# Patient Record
Sex: FEMALE | Race: WHITE
[De-identification: names, ages, dates, MRNs, and addresses within clinical notes are randomized per-mention and may not be internally consistent; named-entity substitution may affect disease eponyms.]

---

## 2017-06-10 ENCOUNTER — HOSPITAL ENCOUNTER (EMERGENCY)
Dept: HOSPITAL 25 - UCEAST | Age: 27
Discharge: HOME | End: 2017-06-10
Payer: COMMERCIAL
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
## 2017-06-10 DIAGNOSIS — S39.012A: Primary | ICD-10-CM
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
## 2017-06-10 PROCEDURE — 99212 OFFICE O/P EST SF 10 MIN: CPT

## 2017-06-10 PROCEDURE — G0463 HOSPITAL OUTPT CLINIC VISIT: HCPCS

## 2017-06-10 NOTE — UC
Back Pain HPI





- HPI Summary


HPI Summary: 


3 DAYS AGO HAD SOME LOW BACK PAIN AFTER LIFTING A LAUNDRY BASKET. HAS CONTINUED 

TO GO TO THE GYM AND WORK. PAIN IS PERSISTENT. DENIES NUMBNESS, TINGLING OR 

SADDLE ANESTHESIA. NO LOSS OF BOWEL OR BLADDER CONTROL. 800 MG IBUPROFEN, 

TYLENOL, ICY HOT NOT HELPING. 





- History of Current Complaint


Chief Complaint: UCBackPain


Stated Complaint: LOWER BACK INJURY


Time Seen by Provider: 06/10/17 10:56


Hx Obtained From: Patient


Hx Last Menstrual Period: 5/27/17


Onset/Duration: Sudden Onset, Lasting Days, Still Present


Timing: Constant


Severity Initially: Moderate


Severity Currently: Moderate


Pain Intensity: 9


Pain Scale Used: 0-10 Numeric


Back Pain: Is Discrete @ - LOW BACK


Character: Sharp, Aching


Aggravating: Movement, Bending


Alleviating: Rest


Associated Signs And Symptoms: Negative: Swelling, Redness, Bruising, Fever, 

Numbness, Tingling, Abdominal Pain, Flank Pain, Bladder Incontinence, Bowel 

Incontinence





- Allergies/Home Medications


Allergies/Adverse Reactions: 


 Allergies











Allergy/AdvReac Type Severity Reaction Status Date / Time


 


No Known Allergies Allergy   Verified 06/10/17 10:12











Home Medications: 


 Home Medications





Norgestimate-Ethinyl Estradiol [Tri-Sprintec 0.18/0.215/0.25 mg-35 Mcg] 1 tab 

PO DAILY 06/10/17 [History Confirmed 06/10/17]











PMH/Surg Hx/FS Hx/Imm Hx


Previously Healthy: Yes





- Surgical History


Surgical History: None


Surgery Procedure, Year, and Place: denies





- Family History


Known Family History: 


   Negative: Hypertension


Family History: NON CONTRIBUTORY





- Social History


Alcohol Use: Occasionally


Substance Use Type: None


Smoking Status (MU): Never Smoked Tobacco





- Immunization History


Most Recent Influenza Vaccination: 2015     season


Most Recent Tetanus Shot: UTD





Review of Systems


Constitutional: Negative


Skin: Negative


Respiratory: Negative


Cardiovascular: Negative


Gastrointestinal: Negative


Musculoskeletal: Arthralgia, Decreased ROM, Myalgia


All Other Systems Reviewed And Are Negative: Yes





Physical Exam


Triage Information Reviewed: Yes


Appearance: Well-Appearing, No Pain Distress, Well-Nourished


Vital Signs: 


 Initial Vital Signs











Temp  98.0 F   06/10/17 10:08


 


Pulse  78   06/10/17 10:08


 


Resp  16   06/10/17 10:08


 


BP  157/65   06/10/17 10:08


 


Pulse Ox  100   06/10/17 10:08











Vital Signs Reviewed: Yes


Eyes: Positive: Conjunctiva Clear


ENT: Positive: Hearing grossly normal


Neck: Positive: Supple


Respiratory: Positive: No respiratory distress, No accessory muscle use


Cardiovascular: Positive: Pulses Normal


Abdomen Description: Positive: Soft


Musculoskeletal: Positive: No Edema, ROM Limited @ - BACK


Neurological: Positive: Alert


Psychological: Positive: Age Appropriate Behavior


Skin: Negative: rashes





Back Pain Course/Dx





- Differential Dx/Diagnosis


Provider Diagnoses: ACUTE LOW BACK STRAIN





Discharge





- Discharge Plan


Condition: Stable


Disposition: HOME


Prescriptions: 


Cyclobenzaprine TAB* [Flexeril TAB*] 10 mg PO BID PRN #30 tab


 PRN Reason: Pain


Hydrocodone-Acetaminophen [Lorcet 5-325 mg] 1 tab PO QID PRN #20 tab MDD 4


 PRN Reason: Pain


Meloxicam [Mobic] 7.5 mg PO BID PRN #30 tab


 PRN Reason: Pain


predniSONE TAB* [Deltasone TAB*] 40 mg PO DAILY #10 tab


Patient Education Materials:  Low Back Strain (ED)


Referrals: 


Alen Sanchez MD [Primary Care Provider] - If Needed


Additional Instructions: 


BE SURE TO GO THROUGH SLOW RANGE OF MOTION AND STRETCHING EXERCISES DAILY AS 

YOU ARE ABLE TO PREVENT STIFFENING UP AND MAKING THE DISCOMFORT WORSE.

## 2018-02-12 ENCOUNTER — HOSPITAL ENCOUNTER (EMERGENCY)
Dept: HOSPITAL 25 - UCEAST | Age: 28
Discharge: HOME | End: 2018-02-12
Payer: COMMERCIAL

## 2018-02-12 VITALS — SYSTOLIC BLOOD PRESSURE: 127 MMHG | DIASTOLIC BLOOD PRESSURE: 83 MMHG

## 2018-02-12 DIAGNOSIS — R30.0: ICD-10-CM

## 2018-02-12 DIAGNOSIS — Z87.440: ICD-10-CM

## 2018-02-12 DIAGNOSIS — R10.2: Primary | ICD-10-CM

## 2018-02-12 DIAGNOSIS — Z32.02: ICD-10-CM

## 2018-02-12 PROCEDURE — 81025 URINE PREGNANCY TEST: CPT

## 2018-02-12 PROCEDURE — G0463 HOSPITAL OUTPT CLINIC VISIT: HCPCS

## 2018-02-12 PROCEDURE — 76830 TRANSVAGINAL US NON-OB: CPT

## 2018-02-12 PROCEDURE — 99211 OFF/OP EST MAY X REQ PHY/QHP: CPT

## 2018-02-12 PROCEDURE — 87591 N.GONORRHOEAE DNA AMP PROB: CPT

## 2018-02-12 PROCEDURE — 87480 CANDIDA DNA DIR PROBE: CPT

## 2018-02-12 PROCEDURE — 87086 URINE CULTURE/COLONY COUNT: CPT

## 2018-02-12 PROCEDURE — 87661 TRICHOMONAS VAGINALIS AMPLIF: CPT

## 2018-02-12 PROCEDURE — 87491 CHLMYD TRACH DNA AMP PROBE: CPT

## 2018-02-12 PROCEDURE — 81003 URINALYSIS AUTO W/O SCOPE: CPT

## 2018-02-12 PROCEDURE — 87510 GARDNER VAG DNA DIR PROBE: CPT

## 2018-02-12 NOTE — RAD
Indication: RIGHT side pelvic pain for 5 days.



Comparison: No relevant prior exams available on the [facility] [place] for comparison.



Technique: Transvaginal pelvic ultrasound.



Report: 8.1 x 3.5 x 3.7 cm anteverted uterus with 5 mm endometrium. Trace fluid in the

endocervical canal.



No appreciable free pelvic fluid evident.



2.1 x 1.1 x 1.9 cm RIGHT ovary with documented vascular flow is unremarkable. 2.0 x 1.1 x

0.9 cm LEFT ovary with documented vascular flow is unremarkable.



No visualized extra ovarian adnexal region lesions evident.



IMPRESSION: Negative pelvic ultrasound.

## 2018-02-12 NOTE — UC
Complaint Female HPI





- HPI Summary


HPI Summary: 





26 yo female has not felt right ("down there") for about a month


was treated for a UTI a month ago with a three day coarse of antibotics


since then has had mild dyspareunia


had a bit diarrhea and nausea last week


now with right sided pelvic pain x 3 days


no vag d/c or itch


no f/c


no back pain


mild intermittent dysuria





pain is mild


no increased pain with jumping/walking/movement





- History Of Current Complaint


Chief Complaint: UCAbdominalPain


Stated Complaint: LOWER PELVIC PAIN


Time Seen by Provider: 02/12/18 09:03


Hx Obtained From: Patient


Hx Last Menstrual Period: 2/5/18


Onset/Duration: Gradual Onset, Lasting Days


Timing: Constant


Severity Initially: Mild


Severity Currently: Mild


Pain Intensity: 3


Pain Scale Used: 0-10 Numeric


Character: Dull


Aggravating Factor(s): [place]


Alleviating Factor(s): Nothing


Associated Signs And Symptoms: Negative: Fever, Back Pain, Vaginal Bleeding/

Discharge, Vaginal Discharge, Nausea, Vomiting(# Of Episodes =), Genital 

Swelling, Genital Blisters, Retained Foregin Body (Specify)





- Allergies/Home Medications


Allergies/Adverse Reactions: 


 Allergies











Allergy/AdvReac Type Severity Reaction Status Date / Time


 


No Known Allergies Allergy   Verified 02/12/18 08:39











Home Medications: 


 Home Medications





Norgestimate-Ethinyl Estradiol [Tri-Sprintec Tablet] 1 tab PO DAILY 02/12/18 [

History Confirmed 02/12/18]











PMH/Surg Hx/FS Hx/Imm Hx


Previously Healthy: Yes





- Surgical History


Surgical History: None


Surgery Procedure, Year, and Place: denies





- Family History


Known Family History: 


   Negative: Hypertension


Family History: NON CONTRIBUTORY





- Social History


Alcohol Use: Occasionally


Substance Use Type: None


Smoking Status (MU): Never Smoked Tobacco





- Immunization History


Most Recent Influenza Vaccination: 2015     season


Most Recent Tetanus Shot: UTD





Review of Systems


Constitutional: Negative


Skin: Negative


Eyes: Negative


ENT: Negative


Respiratory: Negative


Cardiovascular: Negative


Gastrointestinal: Negative


Genitourinary: Other - right pelvic pain


Motor: Negative


Neurovascular: Negative


Musculoskeletal: Negative


Neurological: Negative


Psychological: Negative


Is Patient Immunocompromised?: No


All Other Systems Reviewed And Are Negative: Yes





Physical Exam


Triage Information Reviewed: Yes


Appearance: Well-Appearing, No Pain Distress, Well-Nourished


Vital Signs: 


 Initial Vital Signs











Temp  97.6 F   02/12/18 08:41


 


Pulse  78   02/12/18 08:41


 


Resp  16   02/12/18 08:41


 


BP  127/83   02/12/18 08:41


 


Pulse Ox  100   02/12/18 08:41











Eye Exam: Normal


ENT: Positive: Normal ENT inspection, Hearing grossly normal, Pharynx normal.  

Negative: Nasal drainage, TMs normal, TM bulging, Sinus tenderness, Uvula 

midline


Neck: Positive: Supple, Nontender, No Lymphadenopathy


Respiratory: Positive: Lungs clear, Normal breath sounds, No respiratory 

distress, No accessory muscle use


Cardiovascular: Positive: RRR, No Murmur


Abdomen Description: Positive: Nontender, No Organomegaly, Soft.  Negative: CVA 

Tenderness (R), CVA Tenderness (L), Distended, Guarding, Hernia @, Hepatomegaly

, McBurney's Point Tenderness, Peritoneal Signs, Pulsatile Mass, Splenomegaly


Musculoskeletal: Positive: ROM Intact, No Edema


Neurological: Positive: Alert


Psychological Exam: Normal


Skin Exam: Normal





Diagnostics





- Radiology


  ** No standard instances


Xray Interpretation: No Acute Changes


Radiology Interpretation Completed By: Radiologist





 Complaint Female Dx





- Course


Course Of Treatment: speculum exam:  ext gent- no lesions.  vagina- mild to 

moderate whitish D/C.  cx- no CMT.  adenexa- tender right.  uterus- non tender





- Differential Dx/Diagnosis


Provider Diagnoses: right sided pelvic pain of uncertain cause





Discharge





- Discharge Plan


Condition: Stable


Disposition: HOME


Patient Education Materials:  Pelvic Pain (ED)


Referrals: 


Kolby Herrera MD [Medical Doctor] - If Needed


Additional Instructions: 


various tests are pending including a urine culture and tests on you vaginal 

swabs





I an unsure of the cause of your pain





I suggest that if it worsens you go to the ER


-increased pain


-fever 


-vomiting





 recheck later this week if not better


-here


[facility]


-[facility]

## 2018-03-10 ENCOUNTER — HOSPITAL ENCOUNTER (EMERGENCY)
Dept: HOSPITAL 25 - UCEAST | Age: 28
Discharge: HOME | End: 2018-03-10
Payer: COMMERCIAL

## 2018-03-10 VITALS — SYSTOLIC BLOOD PRESSURE: 139 MMHG | DIASTOLIC BLOOD PRESSURE: 86 MMHG

## 2018-03-10 DIAGNOSIS — K59.00: Primary | ICD-10-CM

## 2018-03-10 DIAGNOSIS — R30.0: ICD-10-CM

## 2018-03-10 DIAGNOSIS — R19.7: ICD-10-CM

## 2018-03-10 DIAGNOSIS — R35.0: ICD-10-CM

## 2018-03-10 DIAGNOSIS — R03.0: ICD-10-CM

## 2018-03-10 DIAGNOSIS — R11.0: ICD-10-CM

## 2018-03-10 DIAGNOSIS — Z32.02: ICD-10-CM

## 2018-03-10 DIAGNOSIS — Z87.440: ICD-10-CM

## 2018-03-10 PROCEDURE — 84702 CHORIONIC GONADOTROPIN TEST: CPT

## 2018-03-10 PROCEDURE — G0463 HOSPITAL OUTPT CLINIC VISIT: HCPCS

## 2018-03-10 PROCEDURE — 87086 URINE CULTURE/COLONY COUNT: CPT

## 2018-03-10 PROCEDURE — 81003 URINALYSIS AUTO W/O SCOPE: CPT

## 2018-03-10 PROCEDURE — 99212 OFFICE O/P EST SF 10 MIN: CPT

## 2018-03-10 PROCEDURE — 74018 RADEX ABDOMEN 1 VIEW: CPT

## 2018-03-10 NOTE — UC
Pankaj HOOD Jennifer, scribed for Luzma Cleary DO on 03/10/18 at 0810 .





 Complaint Female HPI





- HPI Summary


HPI Summary: 





The pt is a 29 y/o female who complains of pelvic pain that radiates to her 

right abdomen and both sides of her back since one month ago. Pt reports she 

was at Urgent Care for pelvic pain one month ago but the transvaginal 

ultrasound was negative. The pain persisted and never resolved. Pt reports she 

had a UTI a few days ago and wonders if this is a kidney infection. The pain is 

rated a 4/10 and is described as a dull, jabbing pain. Sitting and standing 

aggravate the pain, but she does not notice it when she is walking or lying 

down. She additionally complains of dysuria, increased frequency of urination, 

intermittent diarrhea, feeling gassy, and nausea. Pt denies fevers, blood in 

the stool, discharge, chest pain, shortness of breath, and recent weight loss.








- History Of Current Complaint


Chief Complaint: UCGU


Stated Complaint: BACK PAIN, FREQUENT URINATION


Time Seen by Provider: 03/10/18 07:10


Hx Obtained From: Patient


Hx Last Menstrual Period: 1 WEEK AGO - IS ON BIRTH CONTROL PILLS


Onset/Duration: Gradual Onset, Still Present, Other - Constant pain for one 

month


Timing: Constant


Severity Initially: Moderate


Severity Currently: Moderate


Pain Intensity: 4


Pain Scale Used: 0-10 Numeric


Character: Dull


Aggravating Factor(s): Other - Sitting, standing


Alleviating Factor(s): Other - walking, lying down


Associated Signs And Symptoms: Positive: Back Pain, Nausea.  Negative: Fever, 

Vaginal Discharge





- Allergies/Home Medications


Allergies/Adverse Reactions: 


 Allergies











Allergy/AdvReac Type Severity Reaction Status Date / Time


 


No Known Allergies Allergy   Verified 03/10/18 07:22














PMH/Surg Hx/FS Hx/Imm Hx


Previously Healthy: Yes - NEG: HTN, DM





- Surgical History


Surgical History: None


Surgery Procedure, Year, and Place: denies





- Family History


Known Family History: Positive: Cardiac Disease - Father  of heart attack


   Negative: Hypertension


Family History: NON CONTRIBUTORY





- Social History


Alcohol Use: Occasionally


Substance Use Type: None


Smoking Status (MU): Never Smoked Tobacco





- Immunization History


Most Recent Influenza Vaccination: 2015     season


Most Recent Tetanus Shot: UTD





Review of Systems


Constitutional: Negative - Fever


Respiratory: Negative - shortness of breath


Cardiovascular: Negative - Chest pain


Gastrointestinal: Abdominal Pain, Diarrhea - Intermittent, Nausea, Other - 

Feeling gassy


Genitourinary: Negative - Vaginal discharge, blood in stool, Dysuria, Frequency


Musculoskeletal: Other: - Back pain


All Other Systems Reviewed And Are Negative: Yes





Physical Exam





- Summary


Physical Exam Summary: 





Appearance: Well-Appearing, No Pain Distress, Well-Nourished


Eyes: conjunctiva clear, no discharge


ENT: Hearing grossly normal, no muffled/hoarse voice.


Neck: Normal, Supple


Respiratory/Lung Sounds: Lungs clear, Normal breath sounds, No respiratory 

distress, No accessory muscle use


Cardiovascular: RRR, No murmur


Abdomen: minimal tenderness in RUQ, mild tenderness in LLQ and suprapubic region

, RLQ was palpated extensively with no tenderness. Soft, no guarding, not 

distended


Bowel Sounds: Present


Musculoskeletal: Normal 


Neurological: Alert, muscle tone normal 


Psychiatric:Normal, age appropriate behavior


Skin: Normal, Warm, Dry, Normal color


Triage Information Reviewed: Yes


Vital Signs: 


 Initial Vital Signs











Temp  98.6 F   03/10/18 07:23


 


Pulse  80   03/10/18 07:23


 


Resp  16   03/10/18 07:23


 


BP  139/86   03/10/18 07:23


 


Pulse Ox  100   03/10/18 07:23











Vital Signs Reviewed: Yes





Diagnostics





- Radiology


  ** Abd XR


Xray Interpretation: No Acute Changes - NONOBSTRUCTIVE BOWEL GAS PATTERN. Dr. Cleary has reviewed this report.


Radiology Interpretation Completed By: Radiologist





 Complaint Female Dx





- Course


Course Of Treatment: Patient will be discharged with prescription for 

Macrodantin and follow up from PCP. The patient is agreeable with this plan. 

Medications reviewed. High blood pressure noted.





- Differential Dx/Diagnosis


Provider Diagnoses: Elevated blood pressure without diagnosis of hypertension, 

Constipation, Diarrhea





Discharge





- Discharge Plan


Condition: Stable


Disposition: HOME


Prescriptions: 


Nitrofurantoin Macrocrystals* [Macrodantin*] 100 mg PO BID #10 cap


Patient Education Materials:  Constipation (DC), Dysuria (ED)


Referrals: 


[facility] [place] PHYSICIAN REFERRAL [Outside]


Additional Instructions: 


WE ARE NOT SURE WHETHER OR NOT YOU HAVE AN INFECTION.  THE URINE DIP DONE HERE 

IN THE CLINIC DID NOT REVEAL EVIDENCE OF INFECTION.  HOWEVER, WE ARE GOING TO 

GO AHEAD AND START TO TREAT YOU FOR A UTI BECAUSE YOU HAVE PAIN WITH URINATION 

BECAUSE YOU HAVE SOME PAIN WITH URINATION.  WE ARE SENDING YOUR URINE FOR 

MICROSCOPY AND CULTURE TO GET A DEFINITIVE ANSWER.  PLEASE CALL BACK AND CHECK 

ON THESE RESULTS.  IF THESE TESTS RULE OUT INFECTION.  STOP TAKE THE ANTIBIOTIC.








NITROFURANTOIN:


     You have received a prescription for nitrofurantoin (Macrodantin). This 

antibiotic is used for urinary tract infections.


     Persons with G-6-PD (glucose 6-phosphate dehydrogenase) deficiency should 

not take this medication.  Women who are pregnant or nursing should notify the 

physician before taking this medicine.  If you have ever had a problem caused 

by this medication in the past, be sure the physician is aware of it.


     Common side effects of this medicine include nausea, vomiting, or 

decreased appetite.  Notify your physician if these side effects become severe.


     Immediately stop this medicine and call the physician if you develop cough

, shortness of breath, chest pain, weakness, jaundice (yellow color of the skin 

and whites of the eyes), or a skin rash.





ANYTIME YOU TAKE AN ANTIBIOTIC, IT IS IMPORTANT TO REPLENISH THE BODY'S SUPPLY 

OF "GOOD BACTERIA."  YOU CAN GET GOOD BACTERIA FROM HIGH QUALITY CULTURED FOODS 

SUCH AS LOCAL YOGURT, SOUR KRAUT, ARTURO BARNEY, NATURALLY FERMENTED PICKLES AND 

PROBIOTIC DRINKS.  YOU CAN ALSO GET GOOD BACTERIA FROM A PROBIOTIC SUPPLEMENT. 





The documentation as recorded by the Pankaj rodriges Jennifer accurately reflects 

the service I personally performed and the decisions made by me, Luzma Cleary DO.

## 2018-03-10 NOTE — RAD
HISTORY:  Abdominal pain



COMPARISONS: None



VIEWS: Frontal views of the abdomen.



FINDINGS: 

BOWEL: There is a nonobstructive bowel gas pattern. There is a large amount stool within

the proximal and distal colon.

CALCULI: There are no abnormal calculi.

BONES AND SOFT TISSUES: There are no osseous abnormalities.

OTHER FINDINGS: The lung bases are clear. There is no subphrenic gas. Metallic jewelry is

noted overlying the mid abdomen.



IMPRESSION: 

NONOBSTRUCTIVE BOWEL GAS PATTERN.

## 2018-03-12 NOTE — UC
- Progress Note


Progress Note: 





please call this pt and let her know that here urine cx was neg.  no uti.  ok 

to stop abx.

## 2018-10-29 ENCOUNTER — HOSPITAL ENCOUNTER (EMERGENCY)
Dept: HOSPITAL 25 - UCEAST | Age: 28
Discharge: HOME | End: 2018-10-29
Payer: COMMERCIAL

## 2018-10-29 VITALS — SYSTOLIC BLOOD PRESSURE: 144 MMHG | DIASTOLIC BLOOD PRESSURE: 89 MMHG

## 2018-10-29 DIAGNOSIS — N39.0: Primary | ICD-10-CM

## 2018-10-29 DIAGNOSIS — N76.0: ICD-10-CM

## 2018-10-29 PROCEDURE — 99212 OFFICE O/P EST SF 10 MIN: CPT

## 2018-10-29 PROCEDURE — G0463 HOSPITAL OUTPT CLINIC VISIT: HCPCS

## 2018-10-29 PROCEDURE — 87086 URINE CULTURE/COLONY COUNT: CPT

## 2018-10-29 PROCEDURE — 81003 URINALYSIS AUTO W/O SCOPE: CPT

## 2018-10-29 PROCEDURE — 84702 CHORIONIC GONADOTROPIN TEST: CPT

## 2018-10-29 NOTE — UC
Complaint Female HPI





- HPI Summary


HPI Summary: 





28-year-old woman comes to clinic today with a chief complaint of dysuria.  

Started about a week ago.  She went to [facility] 4 days ago and was 

told she has UTI and was started on Bactrim.  The symptoms have not gotten any 

better.  She has been taking Pyridium which decreased the symptoms however she 

stopped because he wanted to make sure she knew if the urinary tract infection 

with away or not.  No fevers or chills.  She has urgency and frequency.  Some 

suprapubic discomfort no flank pain.  She does get vaginal yeast infection 

since she feels like she has one now.  She denies any STI's and was recently 

checked at [facility] for STI's.





- History Of Current Complaint


Chief Complaint: UCGU


Stated Complaint: UTI


Time Seen by Provider: 10/29/18 07:57


Hx Last Menstrual Period: 10/11/18


Pain Intensity: 5





- Allergies/Home Medications


Allergies/Adverse Reactions: 


 Allergies











Allergy/AdvReac Type Severity Reaction Status Date / Time


 


No Known Allergies Allergy   Verified 10/29/18 07:50











Home Medications: 


 Home Medications





Sulfamethox/Trimethoprim DS* [Bactrim /160 TAB*] 1 tab PO BID 10/29/18 [

History Confirmed 10/29/18]











PMH/Surg Hx/FS Hx/Imm Hx


Previously Healthy: Yes





- Surgical History


Surgical History: None


Surgery Procedure, Year, and Place: denies





- Family History


Known Family History: Positive: Cardiac Disease - Father  of heart attack


   Negative: Hypertension, Diabetes


Family History: NON CONTRIBUTORY





- Social History


Alcohol Use: Occasionally


Substance Use Type: None


Smoking Status (MU): Never Smoked Tobacco





- Immunization History


Most Recent Influenza Vaccination: 2015     season


Most Recent Tetanus Shot: UTD





Review of Systems


Constitutional: Negative


Skin: Negative


Eyes: Negative


ENT: Negative


Respiratory: Negative


Cardiovascular: Negative


Gastrointestinal: Negative


Genitourinary: Dysuria, Frequency, Urgency, Vaginal/Penile Discharge


Motor: Negative


Neurovascular: Negative


Musculoskeletal: Negative


Neurological: Negative


Psychological: Negative


Is Patient Immunocompromised?: No


All Other Systems Reviewed And Are Negative: Yes





Physical Exam


Triage Information Reviewed: Yes


Appearance: Well-Appearing, No Pain Distress, Well-Nourished


Vital Signs: 


 Initial Vital Signs











Temp  99 F   10/29/18 07:43


 


Pulse  68   10/29/18 07:43


 


Resp  18   10/29/18 07:43


 


BP  144/89   10/29/18 07:43


 


Pulse Ox  100   10/29/18 07:43











Vital Signs Reviewed: Yes


Eye Exam: Normal


Eyes: Positive: Conjunctiva Clear


ENT: Negative: Nasal congestion


Neck exam: Normal


Neck: Positive: Supple


Respiratory Exam: Normal


Respiratory: Positive: Lungs clear, Normal breath sounds, No respiratory 

distress


Cardiovascular: Positive: RRR


Abdomen Description: Positive: Nontender, Soft.  Negative: CVA Tenderness (R), 

CVA Tenderness (L)


Bowel Sounds: Positive: Present


Musculoskeletal Exam: Normal


Musculoskeletal: Positive: Strength Intact, ROM Intact


Neurological Exam: Normal


Neurological: Positive: Alert, Muscle Tone Normal


Psychological Exam: Normal


Psychological: Positive: Age Appropriate Behavior


Skin Exam: Normal





 Complaint Female Dx





- Course


Course Of Treatment: The UA showed some leukocytes.  Different possibilities 

include the back daily for the UTI is resistant to Bactrim versus the symptoms 

are due to vaginal yeast infection or a combination of both.  The plan is to 

switch to Keflex and also treat with Diflucan.  Get rechecked if not improved.  

Also urine will go to culture today.  Patient recently was checked for STI's 

and does not have any concerns of STI's therefore no pelvic was done today.





- Differential Dx/Diagnosis


Provider Diagnoses: UTI.  VAGINITIS





Discharge





- Sign-Out/Discharge


Documenting (check all that apply): Patient Departure


All imaging exams completed and their final reports reviewed: No Studies





- Discharge Plan


Condition: Stable


Disposition: HOME


Prescriptions: 


Cephalexin CAP* [Keflex CAP*] 500 mg PO TID #21 cap


Fluconazole [Diflucan 150 MG (NF)] 150 mg PO ONCE #2 tab


Patient Education Materials:  Urinary Tract Infection in Women (ED), Yeast 

Infection (ED)


Referrals: 


[facility] [place] PHYSICIAN REFERRAL [Outside]


Additional Instructions: 


FOLLOW UP WITH YOUR DOCTOR IF NOT COMPLETELY IMPROVED.


GET RECHECKED FOR ANY WORSENING OF YOUR CONDITION OR QUESTIONS OR CONCERNS.





- Billing Disposition and Condition


Condition: STABLE


Disposition: Home

## 2018-10-30 NOTE — UC
- Progress Note


Progress Note: 





Pt called and said that she is having diarrhea from the Keflex. According to 

the office note, [facility] - which did not 

resolve with Bactrim and was then switched to Keflex at her visit at  under 

the assumption that her UTI may have been resistant to Bactrim. Given her 

antibiotic use over the last week - this could be why her urine culture is 

negative, but she is still having symptoms.





Will switch her to Cipro BID for 5 days. STOP keflex. If her symptoms do not 

improve - will need to follow up. 


Please advise to take an OTC probiotic to reduce diarrhea.





Discharge





- Sign-Out/Discharge


Documenting (check all that apply): Patient Departure


All imaging exams completed and their final reports reviewed: No Studies





- Discharge Plan


Condition: Stable


Disposition: HOME


Prescriptions: 


Ciprofloxacin TAB* [Cipro 250 MG Tab*] 250 mg PO BID #10 tab


Fluconazole [Diflucan 150 MG (NF)] 150 mg PO ONCE #2 tab


Patient Education Materials:  Urinary Tract Infection in Women (ED), Yeast 

Infection (ED)


Referrals: 


[facility] [place] PHYSICIAN REFERRAL [Outside]


Additional Instructions: 


FOLLOW UP WITH YOUR DOCTOR IF NOT COMPLETELY IMPROVED.


GET RECHECKED FOR ANY WORSENING OF YOUR CONDITION OR QUESTIONS OR CONCERNS.





- Billing Disposition and Condition


Condition: STABLE


Disposition: Home

## 2018-10-30 NOTE — UC
- Progress Note


Progress Note: 





reviewed urine culture


 no change


ljj 10/30/2018





Discharge





- Sign-Out/Discharge


Documenting (check all that apply): Post-Discharge Follow Up


All imaging exams completed and their final reports reviewed: No Studies





- Discharge Plan


Condition: Stable


Disposition: HOME


Prescriptions: 


Ciprofloxacin TAB* [Cipro 250 MG Tab*] 250 mg PO BID #10 tab


Fluconazole [Diflucan 150 MG (NF)] 150 mg PO ONCE #2 tab


Patient Education Materials:  Urinary Tract Infection in Women (ED), Yeast 

Infection (ED)


Referrals: 


[facility] [place] PHYSICIAN REFERRAL [Outside]


Additional Instructions: 


FOLLOW UP WITH YOUR DOCTOR IF NOT COMPLETELY IMPROVED.


GET RECHECKED FOR ANY WORSENING OF YOUR CONDITION OR QUESTIONS OR CONCERNS.





- Billing Disposition and Condition


Condition: STABLE


Disposition: Home

## 2018-11-10 ENCOUNTER — HOSPITAL ENCOUNTER (EMERGENCY)
Dept: HOSPITAL 25 - UCEAST | Age: 28
Discharge: HOME | End: 2018-11-10
Payer: COMMERCIAL

## 2018-11-10 VITALS — SYSTOLIC BLOOD PRESSURE: 128 MMHG | DIASTOLIC BLOOD PRESSURE: 84 MMHG

## 2018-11-10 DIAGNOSIS — R39.15: ICD-10-CM

## 2018-11-10 DIAGNOSIS — R30.0: Primary | ICD-10-CM

## 2018-11-10 DIAGNOSIS — R35.0: ICD-10-CM

## 2018-11-10 DIAGNOSIS — R10.9: ICD-10-CM

## 2018-11-10 LAB
BASOPHILS # BLD AUTO: 0.1 10^3/UL (ref 0–0.2)
EOSINOPHIL # BLD AUTO: 0.1 10^3/UL (ref 0–0.6)
HCT VFR BLD AUTO: 42 % (ref 35–47)
HGB BLD-MCNC: 13.9 G/DL (ref 12–16)
LYMPHOCYTES # BLD AUTO: 1.9 10^3/UL (ref 1–4.8)
MCH RBC QN AUTO: 31 PG (ref 27–31)
MCHC RBC AUTO-ENTMCNC: 33 G/DL (ref 31–36)
MCV RBC AUTO: 92 FL (ref 80–97)
MONOCYTES # BLD AUTO: 0.6 10^3/UL (ref 0–0.8)
NEUTROPHILS # BLD AUTO: 4.8 10^3/UL (ref 1.5–7.7)
NRBC # BLD AUTO: 0 10^3/UL
NRBC BLD QL AUTO: 0.1
PLATELET # BLD AUTO: 311 10^3/UL (ref 150–450)
RBC # BLD AUTO: 4.55 10^6/UL (ref 4–5.4)
WBC # BLD AUTO: 7.5 10^3/UL (ref 3.5–10.8)

## 2018-11-10 PROCEDURE — 87591 N.GONORRHOEAE DNA AMP PROB: CPT

## 2018-11-10 PROCEDURE — 87510 GARDNER VAG DNA DIR PROBE: CPT

## 2018-11-10 PROCEDURE — 36415 COLL VENOUS BLD VENIPUNCTURE: CPT

## 2018-11-10 PROCEDURE — 85025 COMPLETE CBC W/AUTO DIFF WBC: CPT

## 2018-11-10 PROCEDURE — 87480 CANDIDA DNA DIR PROBE: CPT

## 2018-11-10 PROCEDURE — 87491 CHLMYD TRACH DNA AMP PROBE: CPT

## 2018-11-10 PROCEDURE — 81003 URINALYSIS AUTO W/O SCOPE: CPT

## 2018-11-10 PROCEDURE — G0463 HOSPITAL OUTPT CLINIC VISIT: HCPCS

## 2018-11-10 PROCEDURE — 84702 CHORIONIC GONADOTROPIN TEST: CPT

## 2018-11-10 PROCEDURE — 87086 URINE CULTURE/COLONY COUNT: CPT

## 2018-11-10 PROCEDURE — 86140 C-REACTIVE PROTEIN: CPT

## 2018-11-10 PROCEDURE — 99212 OFFICE O/P EST SF 10 MIN: CPT

## 2018-11-10 PROCEDURE — 80053 COMPREHEN METABOLIC PANEL: CPT

## 2018-11-10 NOTE — UC
Complaint Female HPI





- HPI Summary


HPI Summary: 





28 year old female is here with a chief complaint of dysuria.  This all started 

couple weeks ago.  [facility] where she reported she had a 

pelvic examination told that she had a urinary tract infection and started her 

on Bactrim.  She is not improving and she came here to clinic and a urine 

culture was obtained and the patient was started on Keflex and Diflucan.  The 

urine culture came back with no growth.  She did get some diarrhea the Keflex 

was prescribed Cipro but she preferred to not take the Cipro and just continued 

and finished the Keflex.  Dysuria is still continuing.  Patient denies any 

vaginal discharge.  She has not had intercourse since the pain started.  She is 

having some bilateral lower anterior abdominal discomfort and also bilateral 

flank pain.  Overnight she has some chills but no measured fevers.  She is on 

her period right now.





- History Of Current Complaint


Chief Complaint: UCGU


Stated Complaint: PAIN/FREQUENCY WITH MICTURITION


Time Seen by Provider: 11/10/18 09:46


Hx Last Menstrual Period: 18


Pain Intensity: 3





- Allergies/Home Medications


Allergies/Adverse Reactions: 


 Allergies











Allergy/AdvReac Type Severity Reaction Status Date / Time


 


No Known Allergies Allergy   Verified 11/10/18 09:09














PMH/Surg Hx/FS Hx/Imm Hx


Previously Healthy: Yes





- Surgical History


Surgical History: None


Surgery Procedure, Year, and Place: denies





- Family History


Known Family History: Positive: Cardiac Disease - Father  of heart attack


   Negative: Hypertension, Diabetes


Family History: NON CONTRIBUTORY





- Social History


Alcohol Use: Occasionally


Substance Use Type: None


Smoking Status (MU): Never Smoked Tobacco





- Immunization History


Most Recent Influenza Vaccination: 2015     season


Most Recent Tetanus Shot: UTD





Review of Systems


All Other Systems Reviewed And Are Negative: Yes


Constitutional: Positive: Negative


Skin: Positive: Negative


Eyes: Positive: Negative


ENT: Positive: Negative


Respiratory: Positive: Negative


Cardiovascular: Positive: Negative


Gastrointestinal: Positive: Abdominal Pain


Genitourinary: Positive: Dysuria, Frequency, Urgency


Motor: Positive: Negative


Neurovascular: Positive: Negative


Musculoskeletal: Positive: Negative


Neurological: Positive: Negative


Psychological: Positive: Negative


Is Patient Immunocompromised?: No





Physical Exam


Triage Information Reviewed: Yes


Appearance: Well-Appearing, No Pain Distress, Well-Nourished


Vital Signs: 


 Initial Vital Signs











Temp  98.5 F   11/10/18 09:06


 


Pulse  75   11/10/18 09:06


 


Resp  16   11/10/18 09:06


 


BP  128/84   11/10/18 09:06


 


Pulse Ox  100   11/10/18 09:06











Vital Signs Reviewed: Yes


Eye Exam: Normal


Eyes: Positive: Conjunctiva Clear


Neck exam: Normal


Neck: Positive: Supple


Respiratory Exam: Normal


Respiratory: Positive: Lungs clear, Normal breath sounds, No respiratory 

distress


Cardiovascular Exam: Normal


Cardiovascular: Positive: RRR


Abdomen Description: Positive: CVA Tenderness (R) - MILD, CVA Tenderness (L) - 

MILD, Other: - Mild tenderness to palpation right lower quadrant and left lower 

quadrant there is no tenderness suprapubic.  No rebound


Bowel Sounds: Positive: Present


Pelvic Exam: Positive: External Exam Normal, Bimanual Exam Normal, No Cerv. 

Motion Tender, No Masses, Active Bleeding - patient on menses, Other - TTP over 

suprapubic margaret, 2 small petechia noted on cervix ~ 2, 4 region no strawberry 

cervix, not friable.  Negative: Cervicitis, Discharge, Lesions, Mass, Tender w/ 

Cervical Motion, Tender Adnexa, Tender Uterus


Musculoskeletal Exam: Normal


Musculoskeletal: Positive: Strength Intact, ROM Intact


Neurological Exam: Normal


Neurological: Positive: Alert, Muscle Tone Normal


Psychological Exam: Normal


Psychological: Positive: Age Appropriate Behavior


Skin Exam: Normal





 Complaint Female Dx





- Course


Course Of Treatment: The patient is well appearing the pain is mild.  We 

discussed going to the emergency department where they can do imaging and blood 

work right away.  We do not have ultrasound or CT here today clinic.  Patient 

prefers not to go to the emergency department.  The plan is to do a pelvic exam 

here in clinic and send for vaginal infections and also repeat the urine with 

urine culture.  We will also do lab work CBC CMP and CRP.  If we find a 

treatable answer we will treat as indicated.  If the patient is not improved or 

worse she indicated she like to come back when we do have ultrasound and CT.  

At that time will need to determine if CT to look for kidney stone on 

ultrasound to look for uterine cause of the pain is indicated.  Patient does go 

the emergency department if her condition worsens.  She does not have a primary 

care physician at this time and it would be good for her to get a primary care 

physician for follow-up.





- Differential Dx/Diagnosis


Provider Diagnoses: DYSURIA





Discharge





- Sign-Out/Discharge


Documenting (check all that apply): Patient Departure


All imaging exams completed and their final reports reviewed: No Studies





- Discharge Plan


Condition: Stable


Disposition: HOME


Patient Education Materials:  Dysuria (ED)


Referrals: 


Alen Sanchez MD [Primary Care Provider] - 


Additional Instructions: 


FOLLOW UP WITH YOUR DOCTOR.


GO TO THE EMERGENCY DEPARTMENT FOR ANY WORSENING OF YOUR CONDITION; PAIN, FEVER

, YOU FEEL ILL OR QUESTIONS OR CONCERNS.








- Billing Disposition and Condition


Condition: STABLE


Disposition: Home

## 2018-11-12 ENCOUNTER — HOSPITAL ENCOUNTER (EMERGENCY)
Dept: HOSPITAL 25 - UCEAST | Age: 28
Discharge: HOME | End: 2018-11-12
Payer: COMMERCIAL

## 2018-11-12 VITALS — DIASTOLIC BLOOD PRESSURE: 89 MMHG | SYSTOLIC BLOOD PRESSURE: 137 MMHG

## 2018-11-12 DIAGNOSIS — R30.0: Primary | ICD-10-CM

## 2018-11-12 PROCEDURE — 87798 DETECT AGENT NOS DNA AMP: CPT

## 2018-11-12 PROCEDURE — G0463 HOSPITAL OUTPT CLINIC VISIT: HCPCS

## 2018-11-12 PROCEDURE — 99211 OFF/OP EST MAY X REQ PHY/QHP: CPT

## 2018-11-12 PROCEDURE — 76857 US EXAM PELVIC LIMITED: CPT

## 2018-11-12 NOTE — UC
Complaint Female HPI





- HPI Summary


HPI Summary: 





The patient is a 28-year-old female that has had dysuria urgency and frequency 

for approximately 3 weeks.  She has been seen on multiple occasions both here 

and at [facility].  Her urine cultures have been negative.  She has had 

pelvic exams performed and no cause for her dysuria has been found.  He was 

seen here over the weekend and told to come here for imaging this morning.  He 

denies any fever or chills.  She denies any vaginal discharge or itch.  Denies 

any back pain or abdominal pain.





- History Of Current Complaint


Chief Complaint: UCGU


Stated Complaint: PAIN W/ MICTURITION


Time Seen by Provider: 18 07:16


Hx Obtained From: Patient


Hx Last Menstrual Period: 18


Timing: Intermittent, Lasting Minutes


Severity Initially: Mild


Severity Currently: None


Pain Intensity: 0 - pain only with urination


Pain Scale Used: 0-10 Numeric


Character: Burning


Aggravating Factor(s): Urination


Alleviating Factor(s): Nothing


Associated Signs And Symptoms: Negative: Fever, Back Pain, Vaginal Bleeding/

Discharge, Vaginal Discharge, Nausea, Vomiting(# Of Episodes =), Genital 

Swelling, Genital Blisters, Retained Foregin Body (Specify)


Related Hx: Similar Episode/Dx as: - had ha one UTI





- Allergies/Home Medications


Allergies/Adverse Reactions: 


 Allergies











Allergy/AdvReac Type Severity Reaction Status Date / Time


 


No Known Allergies Allergy   Verified 18 07:20














PMH/Surg Hx/FS Hx/Imm Hx


Previously Healthy: Yes





- Surgical History


Surgical History: None


Surgery Procedure, Year, and Place: denies





- Family History


Known Family History: Positive: Cardiac Disease - Father  of heart attack


   Negative: Hypertension, Diabetes


Family History: NON CONTRIBUTORY





- Social History


Alcohol Use: Occasionally


Substance Use Type: None


Smoking Status (MU): Never Smoked Tobacco





- Immunization History


Most Recent Influenza Vaccination: 2015     season


Most Recent Tetanus Shot: UTD





Review of Systems


All Other Systems Reviewed And Are Negative: Yes


Constitutional: Positive: Negative


Skin: Positive: Negative


Eyes: Positive: Negative


ENT: Positive: Negative


Respiratory: Positive: Negative


Cardiovascular: Positive: Negative


Gastrointestinal: Positive: Negative


Genitourinary: Positive: Dysuria


Motor: Positive: Negative


Neurovascular: Positive: Negative


Musculoskeletal: Positive: Negative


Neurological: Positive: Negative


Psychological: Positive: Negative





Physical Exam


Triage Information Reviewed: Yes


Appearance: Well-Appearing, No Pain Distress, Well-Nourished


Vital Signs: 


 Initial Vital Signs











Temp  98.4 F   18 07:14


 


Pulse  69   18 07:14


 


Resp  18   18 07:14


 


BP  137/89   18 07:14


 


Pulse Ox  98   18 07:14











Vital Signs Reviewed: Yes


Eyes: Positive: Conjunctiva Clear


ENT: Positive: Hearing grossly normal.  Negative: Nasal congestion, Nasal 

drainage, Trismus, Muffled voice, Hoarse voice, Sinus tenderness, Uvula midline


Neck: Positive: Supple, Nontender, No Lymphadenopathy


Respiratory: Positive: Lungs clear, Normal breath sounds, No respiratory 

distress, No accessory muscle use


Cardiovascular: Positive: RRR, No Murmur


Abdomen Description: Positive: Nontender, No Organomegaly, Soft.  Negative: CVA 

Tenderness (R), CVA Tenderness (L)


Musculoskeletal: Positive: ROM Intact, No Edema


Neurological: Positive: Alert


Psychological Exam: Normal


Skin Exam: Normal





Diagnostics





- Radiology


  ** No standard instances


Radiology Interpretation Completed By: Radiologist


Summary of Radiographic Findings: normal u/s of bladder





 Complaint Female Dx





- Differential Dx/Diagnosis


Provider Diagnoses: dysuria of uncertain cause





Discharge





- Sign-Out/Discharge


Documenting (check all that apply): Patient Departure


All imaging exams completed and their final reports reviewed: Yes





- Discharge Plan


Condition: Stable


Disposition: HOME


Patient Education Materials:  Dysuria (ED)


Referrals: 


Alen Sanchez MD [Primary Care Provider] - If Needed


Heber Pearson MD [Medical Doctor] - 1 Week


Additional Instructions: 


further urine tests are pending





if nothing turns up with those tests I suggest you see a specialist





to ER for new or worsening symptoms











- Billing Disposition and Condition


Condition: STABLE


Disposition: Home

## 2018-11-14 NOTE — UC
- Progress Note


Progress Note: 





11/14/2018


Urine Mycoplasm and Ureplasm are negative


Pt was Dx w/ dyuria of unknown origen.


No change


Sandra Winter PA-C 





Discharge





- Sign-Out/Discharge


Documenting (check all that apply): Patient Departure - D/c home


All imaging exams completed and their final reports reviewed: Yes





- Discharge Plan


Condition: Stable


Disposition: HOME


Patient Education Materials:  Dysuria (ED)


Referrals: 


Alen Sanchez MD [Primary Care Provider] - If Needed


Heber Pearson MD [Medical Doctor] - 1 Week


Additional Instructions: 


further urine tests are pending





if nothing turns up with those tests I suggest you see a specialist





to ER for new or worsening symptoms











- Billing Disposition and Condition


Condition: STABLE


Disposition: Home

## 2019-02-15 ENCOUNTER — HOSPITAL ENCOUNTER (EMERGENCY)
Dept: HOSPITAL 25 - ED | Age: 29
Discharge: LEFT BEFORE DISCHARGE | End: 2019-02-15
Payer: COMMERCIAL

## 2019-02-15 ENCOUNTER — HOSPITAL ENCOUNTER (EMERGENCY)
Dept: HOSPITAL 25 - UCEAST | Age: 29
Discharge: HOME | End: 2019-02-15
Payer: COMMERCIAL

## 2019-02-15 VITALS — DIASTOLIC BLOOD PRESSURE: 79 MMHG | SYSTOLIC BLOOD PRESSURE: 151 MMHG

## 2019-02-15 VITALS — SYSTOLIC BLOOD PRESSURE: 143 MMHG | DIASTOLIC BLOOD PRESSURE: 86 MMHG

## 2019-02-15 DIAGNOSIS — R05: ICD-10-CM

## 2019-02-15 DIAGNOSIS — R20.2: ICD-10-CM

## 2019-02-15 DIAGNOSIS — R11.0: ICD-10-CM

## 2019-02-15 DIAGNOSIS — R07.89: Primary | ICD-10-CM

## 2019-02-15 DIAGNOSIS — Z53.21: ICD-10-CM

## 2019-02-15 DIAGNOSIS — R00.2: Primary | ICD-10-CM

## 2019-02-15 LAB
ALBUMIN SERPL BCG-MCNC: 4.6 G/DL (ref 3.2–5.2)
ALBUMIN/GLOB SERPL: 2.1 {RATIO} (ref 1–3)
ALP SERPL-CCNC: 54 U/L (ref 34–104)
ALT SERPL W P-5'-P-CCNC: 16 U/L (ref 7–52)
ANION GAP SERPL CALC-SCNC: 8 MMOL/L (ref 2–11)
AST SERPL-CCNC: 19 U/L (ref 13–39)
BASOPHILS # BLD AUTO: 0 10^3/UL (ref 0–0.2)
BUN SERPL-MCNC: 16 MG/DL (ref 6–24)
BUN/CREAT SERPL: 26.2 (ref 8–20)
CALCIUM SERPL-MCNC: 9.3 MG/DL (ref 8.6–10.3)
CHLORIDE SERPL-SCNC: 105 MMOL/L (ref 101–111)
EOSINOPHIL # BLD AUTO: 0 10^3/UL (ref 0–0.6)
GLOBULIN SER CALC-MCNC: 2.2 G/DL (ref 2–4)
GLUCOSE SERPL-MCNC: 103 MG/DL (ref 70–100)
HCO3 SERPL-SCNC: 23 MMOL/L (ref 22–32)
HCT VFR BLD AUTO: 40 % (ref 35–47)
HGB BLD-MCNC: 13.3 G/DL (ref 12–16)
LYMPHOCYTES # BLD AUTO: 1.4 10^3/UL (ref 1–4.8)
MCH RBC QN AUTO: 31 PG (ref 27–31)
MCHC RBC AUTO-ENTMCNC: 33 G/DL (ref 31–36)
MCV RBC AUTO: 92 FL (ref 80–97)
MONOCYTES # BLD AUTO: 0.5 10^3/UL (ref 0–0.8)
NEUTROPHILS # BLD AUTO: 6.2 10^3/UL (ref 1.5–7.7)
NRBC # BLD AUTO: 0 10^3/UL
NRBC BLD QL AUTO: 0
PLATELET # BLD AUTO: 273 10^3/UL (ref 150–450)
POTASSIUM SERPL-SCNC: 4.6 MMOL/L (ref 3.5–5)
PROT SERPL-MCNC: 6.8 G/DL (ref 6.4–8.9)
RBC # BLD AUTO: 4.35 10^6/UL (ref 4–5.4)
SODIUM SERPL-SCNC: 136 MMOL/L (ref 135–145)
TSH SERPL-ACNC: 1.2 MCIU/ML (ref 0.34–5.6)
WBC # BLD AUTO: 8.1 10^3/UL (ref 3.5–10.8)

## 2019-02-15 PROCEDURE — G0463 HOSPITAL OUTPT CLINIC VISIT: HCPCS

## 2019-02-15 PROCEDURE — 84443 ASSAY THYROID STIM HORMONE: CPT

## 2019-02-15 PROCEDURE — 80053 COMPREHEN METABOLIC PANEL: CPT

## 2019-02-15 PROCEDURE — 99212 OFFICE O/P EST SF 10 MIN: CPT

## 2019-02-15 PROCEDURE — 36415 COLL VENOUS BLD VENIPUNCTURE: CPT

## 2019-02-15 PROCEDURE — 85025 COMPLETE CBC W/AUTO DIFF WBC: CPT

## 2019-02-15 NOTE — UC
Palpitation/Dysrhythmia HP





- HPI Summary


HPI Summary: 


Patient treated for bronchitis with azithromycin 6-8 weeks ago but states she 

feels she never fully recovered. Still has intermittent cough and wheeze and 

sensation of phlegm in her throat. Has throat and upper chest tightness. Denies 

midsternal/left sided chest pain. The last 2 nights has had some nausea 

associated with HR in the low 100s. States she had been running earlier in the 

day but had no sx at that time. Sx lasted less than 2 min then resolved. This 

morning left arm feels a bit tingly "like I slept on it wrong".  





- History of Current Complaint


Chief Complaint: UCChestPain


Stated Complaint: POSS CHEST COLD


Time Seen by Provider: 02/15/19 07:17


Hx Obtained From: Patient


Hx Last Menstrual Period: 19


Onset/Duration: Sudden Onset, Lasting Minutes, Resolved


Severity Initially: Moderate


Severity Currently: Mild


Pain Intensity: 3


Pain Scale Used: 0-10 Numeric


Character: Fast


Aggravating Factor(s): Nothing


Alleviating Factor(s): Other - spontaneous resolution


Associated Signs & Symptoms: Positive: Nausea.  Negative: Dizzy, Syncope, 

Shortness of Breath





- Allergy/Home Medications


Allergies/Adverse Reactions: 


 Allergies











Allergy/AdvReac Type Severity Reaction Status Date / Time


 


No Known Allergies Allergy   Verified 02/15/19 07:30














PMH/Surg Hx/FS Hx/Imm Hx


Previously Healthy: Yes





- Surgical History


Surgical History: None


Surgery Procedure, Year, and Place: denies





- Family History


Known Family History: Positive: Cardiac Disease - Father  of heart attack 

age 45 - alcoholic, smoker


   Negative: Hypertension, Diabetes





- Social History


Alcohol Use: Occasionally


Substance Use Type: None


Smoking Status (MU): Never Smoked Tobacco





- Immunization History


Most Recent Influenza Vaccination: 2015     season


Most Recent Tetanus Shot: UTD





Review of Systems


All Other Systems Reviewed And Are Negative: Yes


Constitutional: Positive: Negative


Respiratory: Positive: Cough, Other - WHEEZE


Cardiovascular: Positive: Palpitations


Gastrointestinal: Positive: Nausea


Neurological: Positive: Paresthesia





Physical Exam


Triage Information Reviewed: Yes


Appearance: Well-Appearing, No Pain Distress, Well-Nourished


Vital Signs: 


 Initial Vital Signs











Temp  98.5 F   02/15/19 07:18


 


Pulse  83   02/15/19 07:18


 


Resp  18   02/15/19 07:18


 


BP  151/79   02/15/19 07:18


 


Pulse Ox  99   02/15/19 07:18











Vital Signs Reviewed: Yes


Eyes: Positive: Conjunctiva Clear


ENT: Positive: Hearing grossly normal, Pharynx normal, TMs normal


Neck: Positive: Supple, Nontender, No Lymphadenopathy


Respiratory Exam: Normal


Cardiovascular Exam: Normal


Abdomen Description: Positive: Soft


Musculoskeletal: Positive: No Edema


Neurological: Positive: Alert


Psychological: Positive: Age Appropriate Behavior


Skin: Negative: Rashes





Diagnostics





- EKG


Cardiac Rate: NL


Cardiac Rhythm: Sinus: Normal


Ectopy: None


ST Segment: Normal





Palpitations Course/Dx





- Course


Course Of Treatment: EKG UNREMARKABLE. EXAM UNREMARKABLE. DISCUSSED TRANSFER TO 

ER FOR EVAL OF PALPITATIONS. PT DECLINES TRANSFER TO ED. ADVISED THAT SHE COULD 

BE RISKING WORSENING OF HER CONDITION THAT COULD POSE A THREAT TO HER LIFE, 

HEALTH AND MEDICAL SAFETY. SHE VERBALIZES UNDERSTANDING AND CONTINUES TO 

DECLINE TRANSFER. WILL CHECK CBC, CMP AND TSH. PT WILL F/U WITH PCP OR 

CARDIOLOGY NEXT WEEK.





- Differential Dx/Diagnosis


Provider Diagnosis: 


 Palpitations








Discharge





- Sign-Out/Discharge


Documenting (check all that apply): Patient Departure


All imaging exams completed and their final reports reviewed: No Studies





- Discharge Plan


Condition: Stable


Disposition: HOME


Prescriptions: 


Albuterol HFA INHALER* [Ventolin HFA Inhaler*] 2 puff INH Q4H PRN #1 mdi


 PRN Reason: Shortness Of Breath


Inhaler, Assist Devices [Aerochamber Mini] 1 each MC Q4H PRN #1 spacer


 PRN Reason: Cough


predniSONE TAB* [Deltasone 20 MG TAB*] 40 mg PO DAILY #10 tab


Patient Education Materials:  Heart Palpitations (ED)


Referrals: 


Alen Sanchez MD [Primary Care Provider] - 5 Days


Mazin Schroeder DO [Medical Doctor] - 1 Week


Additional Instructions: 


Your EKG today was unremarkable.  Unclear cause of your palpitations.  They may 

be due to your ongoing respiratory issues and anxiety related to this.  As we 

discussed we are unable to do an adequate cardiac evaluation here in the urgent 

care.  You have declined transfer to the emergency room today.  Follow-up with 

your PCP and/or cardiology next week for further evaluation of these symptoms.  

Labs drawn today include a blood count, complete metabolic panel and thyroid 

test.  Low threshold for going to the emergency room if your symptoms recur.





We will treat you for persistent cough and wheeze with an inhaler and 

prednisone.  Please be aware that these medications can also elevate the heart 

rate so use with caution and stay well-hydrated.  Avoid excessive caffeine and 

be sure to get enough rest.





YOU HAVE DECLINED TRANSFER TO THE ED TODAY WITH THE UNDERSTANDING THAT YOUR 

CONDITION MAY WORSEN LEADING TO RESPIRATORY DISTRESS/FAILURE, CARDIAC ARREST, 

DEATH/DISABILITY.





- Billing Disposition and Condition


Condition: STABLE


Disposition: Home

## 2019-02-22 ENCOUNTER — HOSPITAL ENCOUNTER (EMERGENCY)
Dept: HOSPITAL 25 - ED | Age: 29
Discharge: HOME | End: 2019-02-22
Payer: COMMERCIAL

## 2019-02-22 VITALS — DIASTOLIC BLOOD PRESSURE: 82 MMHG | SYSTOLIC BLOOD PRESSURE: 154 MMHG

## 2019-02-22 DIAGNOSIS — Z82.49: ICD-10-CM

## 2019-02-22 DIAGNOSIS — R07.9: Primary | ICD-10-CM

## 2019-02-22 LAB
ALBUMIN SERPL BCG-MCNC: 4.1 G/DL (ref 3.2–5.2)
ALBUMIN/GLOB SERPL: 1.6 {RATIO} (ref 1–3)
ALP SERPL-CCNC: 45 U/L (ref 34–104)
ALT SERPL W P-5'-P-CCNC: 11 U/L (ref 7–52)
ANION GAP SERPL CALC-SCNC: 7 MMOL/L (ref 2–11)
AST SERPL-CCNC: 14 U/L (ref 13–39)
BASOPHILS # BLD AUTO: 0 10^3/UL (ref 0–0.2)
BUN SERPL-MCNC: 18 MG/DL (ref 6–24)
BUN/CREAT SERPL: 33.3 (ref 8–20)
CALCIUM SERPL-MCNC: 9 MG/DL (ref 8.6–10.3)
CHLORIDE SERPL-SCNC: 106 MMOL/L (ref 101–111)
EOSINOPHIL # BLD AUTO: 0.1 10^3/UL (ref 0–0.6)
GLOBULIN SER CALC-MCNC: 2.5 G/DL (ref 2–4)
GLUCOSE SERPL-MCNC: 92 MG/DL (ref 70–100)
HCG SERPL QL: < 0.6 MIU/ML
HCO3 SERPL-SCNC: 25 MMOL/L (ref 22–32)
HCT VFR BLD AUTO: 41 % (ref 35–47)
HGB BLD-MCNC: 13.7 G/DL (ref 12–16)
LYMPHOCYTES # BLD AUTO: 1.5 10^3/UL (ref 1–4.8)
MCH RBC QN AUTO: 31 PG (ref 27–31)
MCHC RBC AUTO-ENTMCNC: 33 G/DL (ref 31–36)
MCV RBC AUTO: 92 FL (ref 80–97)
MONOCYTES # BLD AUTO: 0.6 10^3/UL (ref 0–0.8)
NEUTROPHILS # BLD AUTO: 5.5 10^3/UL (ref 1.5–7.7)
NRBC # BLD AUTO: 0 10^3/UL
NRBC BLD QL AUTO: 0
PLATELET # BLD AUTO: 272 10^3/UL (ref 150–450)
POTASSIUM SERPL-SCNC: 4.1 MMOL/L (ref 3.5–5)
PROT SERPL-MCNC: 6.6 G/DL (ref 6.4–8.9)
RBC # BLD AUTO: 4.47 10^6/UL (ref 4–5.4)
SODIUM SERPL-SCNC: 138 MMOL/L (ref 135–145)
TROPONIN I SERPL-MCNC: 0 NG/ML (ref ?–0.04)
WBC # BLD AUTO: 7.7 10^3/UL (ref 3.5–10.8)

## 2019-02-22 PROCEDURE — 86140 C-REACTIVE PROTEIN: CPT

## 2019-02-22 PROCEDURE — 84702 CHORIONIC GONADOTROPIN TEST: CPT

## 2019-02-22 PROCEDURE — 93005 ELECTROCARDIOGRAM TRACING: CPT

## 2019-02-22 PROCEDURE — 80053 COMPREHEN METABOLIC PANEL: CPT

## 2019-02-22 PROCEDURE — 85025 COMPLETE CBC W/AUTO DIFF WBC: CPT

## 2019-02-22 PROCEDURE — 84484 ASSAY OF TROPONIN QUANT: CPT

## 2019-02-22 PROCEDURE — 36415 COLL VENOUS BLD VENIPUNCTURE: CPT

## 2019-02-22 PROCEDURE — 85379 FIBRIN DEGRADATION QUANT: CPT

## 2019-02-22 PROCEDURE — 99283 EMERGENCY DEPT VISIT LOW MDM: CPT

## 2019-02-22 NOTE — ED
HPI Chest Pain





- HPI Summary


HPI Summary: 





Pt is a 27 y/o F presenting to the ED with a chief complaint of chest pain 

onset about 1 week ago in her sternal area that radiates to the bilateral 

anterior chest. She reports associated sob, nausea, and dizziness. She states 

the pain is not worse with position change but the nausea and dizziness does 

get worse with position change. She notes she had bronchitis 8 weeks ago and a 

paternal fhx of MI.





- History of Current Complaint


Chief Complaint: EDChestPainROMI


Time Seen by Provider: 19 10:42


Hx Obtained From: Patient


Hx Last Menstrual Period: 19


Onset/Duration: Started Weeks Ago, Still Present


Timing: Constant, Lasting Weeks


Initial Severity: Mild


Current Severity: Mild


Pain Intensity: 3


Pain Scale Used: 0-10 Numeric


Chest Pain Location: Upper Sternal, Left Anterior, Right Anterior


Chest Pain Radiates: No


Character: Tightness


Aggravating Factor(s): Exertion


Alleviating Factor(s): Nothing


Associated Signs and Symptoms: Positive: Chest Pain, Dizziness, Shortness of 

Breath, Nausea





- Allergy/Home Medications


Allergies/Adverse Reactions: 


 Allergies











Allergy/AdvReac Type Severity Reaction Status Date / Time


 


No Known Allergies Allergy   Verified 19 10:32











Home Medications: 


 Home Medications





Albuterol Sulfate [Ventolin Hfa] 2 puff INH Q4H PRN 19 [History Confirmed 

19]











PMH/Surg Hx/FS Hx/Imm Hx


Previously Healthy: Yes


Endocrine/Hematology History: 


   Denies: Hx Diabetes, Hx Thyroid Disease


Cardiovascular History: 


   Denies: Hx Hypertension


Respiratory History: Reports: Other Respiratory Problems/Disorders - bronchitis 

about 8 wks ago


   Denies: Hx Asthma, Hx Chronic Obstructive Pulmonary Disease (COPD)


GI History: 


   Denies: Hx Ulcer


 History: 


   Denies: Hx Kidney Stones, Hx Renal Disease





- Surgical History


Surgery Procedure, Year, and Place: denies


Infectious Disease History: No


Infectious Disease History: 


   Denies: Hx Hepatitis, Hx Human Immunodeficiency Virus (HIV), History Other 

Infectious Disease, Traveled Outside the [place] in Last 30 Days





- Family History


Known Family History: Positive: Cardiac Disease - Father  of heart attack 

age 45 - alcoholic, smoker


   Negative: Hypertension, Diabetes





- Social History


Alcohol Use: Occasionally


Hx Substance Use: No


Substance Use Type: Reports: None


Hx Tobacco Use: No


Smoking Status (MU): Never Smoked Tobacco





Review of Systems


Negative: Fever


Positive: Chest Pain


Positive: Shortness Of Breath


Positive: Nausea


Neurological: Other - dizziness


All Other Systems Reviewed And Are Negative: Yes





Physical Exam





- Summary


Physical Exam Summary: 





Appearance: The patient is well-nourished in no acute distress and in no acute 

pain.


Skin: The skin is warm and dry and skin color reflects adequate perfusion


HEENT: The head is normocephalic and atraumatic. The pupils are equal and 

reactive. The conjunctivae are clear and without drainage. Nares are patent and 

without drainage. Mouth reveals moist mucous membranes and the throat is 

without erythema and exudate. The external ears are intact. The ear canals are 

patent and without drainage. The tympanic membranes are intact.


Neck: The neck is supple with full range of motion and non-tender. There are no 

carotid bruits. There is no neck vein distension.


Respiratory: Chest is non-tender. Lungs are clear to auscultation and breath 

sounds are symmetrical and equal.


Cardiovascular: Heart is regular rate and rhythm. There is a systolic ejection 

murmur. There is no peripheral edema and pulses are symmetrical and equal.


Abdomen: The abdomen is soft and non-tender. There are normal bowel sounds 

heard in all four quadrants and there is no organomegaly palpated.


Musculoskeletal: There is no back tenderness noted. Extremities are non-tender 

with full range of motion. There is good capillary refill. There is no 

peripheral edema or calf tenderness elicited.


Neurological: Patient is alert and oriented to person, place and time. The 

patient has symmetrical motor strength in all four extremities. Cranial nerves 

are grossly intact. Deep tendon reflexes are symmetrical and equal in all four 

extremities.


Psychiatric: The patient has an appropriate affect and does not exhibit any 

anxiety or depression. 





Triage Information Reviewed: Yes


Vital Signs On Initial Exam: 


 Initial Vitals











Temp Pulse Resp BP Pulse Ox


 


 98.1 F   83   16   153/88   100 


 


 19 10:26  19 10:26  19 10:26  19 10:26  19 10:26











Vital Signs Reviewed: Yes





Diagnostics





- Vital Signs


 Vital Signs











  Temp Pulse Resp BP Pulse Ox


 


 19 11:01   91  19   100


 


 19 10:40   87  17  148/86  100


 


 19 10:26  98.1 F  83  16  153/88  100














- Laboratory


Result Diagrams: 


 19 11:28





 19 11:28


Lab Statement: Any lab studies that have been ordered have been reviewed, and 

results considered in the medical decision making process.





- EKG


  ** 1038


Cardiac Rate: NL - 84bpm


EKG Rhythm: Sinus Rhythm


ST Segment: Normal


Ectopy: None





Chest Pain Course/Dx





- Course


Course Of Treatment: Ms. Faye presented with symptoms of a tight feeling in 

her chest and shortness of breath for over a week now.  She initially had a 

bronchitis and then developed the symptoms and was seen at the [facility].  She was treated with albuterol inhaler and steroids 

which she states does not help her.  She was recommended that she get a 

echocardiogram.  She set up an appointment with her PCP Dr. Sanchez but didn't 

keep that appointment and went to the emergency department last Wednesday.  She 

was worked up for cardiac etiology with troponins and also a d-dimer at that 

point and was discharged to get the outpatient echocardiogram.  She was unable 

to get that scheduled and came back to the emergency department.  On exam she 

was nontoxic in appearance with stable vital signs.  I heard a very soft 

systolic ejection murmur which sounds like a flow murmur.  It's not noted in 

her previous visits and I discussed it with Dr. Sanchez who agreed to see her 

promptly in his office.  She was stable and her workup here was likewise 

negative.





- Diagnoses


Provider Diagnoses: 


 Chest pain








Discharge





- Sign-Out/Discharge


Documenting (check all that apply): Patient Departure


Patient Received Moderate/Deep Sedation with Procedure: No





- Discharge Plan


Condition: Stable


Disposition: HOME


Referrals: 


Alen Sanchez MD [Primary Care Provider] - 


Additional Instructions: 


Please follow up with Dr. Sanchez TODAY.





Return to the emergency department with any new or worsening symptoms.





- Billing Disposition and Condition


Condition: STABLE


Disposition: Home





- Attestation Statements


Document Initiated by Scribe: Yes


Documenting Scribe: Christie Bell


Provider For Whom Marni is Documenting (Include Credential): David Carrillo MD.


Scribe Attestation: 


Christie HOOD scribed for David Carrillo MD. on 19 at 2242. 


Scribe Documentation Reviewed: Yes


Provider Attestation: 


The documentation as recorded by the Christie rodriges accurately 

reflects the service I personally performed and the decisions made by me, 

David Carrillo MD.


Status of Scribe Document: Viewed

## 2019-11-11 ENCOUNTER — HOSPITAL ENCOUNTER (EMERGENCY)
Dept: HOSPITAL 25 - UCEAST | Age: 29
Discharge: HOME | End: 2019-11-11
Payer: COMMERCIAL

## 2019-11-11 VITALS — SYSTOLIC BLOOD PRESSURE: 138 MMHG | DIASTOLIC BLOOD PRESSURE: 84 MMHG

## 2019-11-11 DIAGNOSIS — R50.9: ICD-10-CM

## 2019-11-11 DIAGNOSIS — R51: ICD-10-CM

## 2019-11-11 DIAGNOSIS — R09.81: Primary | ICD-10-CM

## 2019-11-11 PROCEDURE — G0463 HOSPITAL OUTPT CLINIC VISIT: HCPCS

## 2019-11-11 PROCEDURE — 99211 OFF/OP EST MAY X REQ PHY/QHP: CPT

## 2019-11-11 NOTE — UC
UC General HPI





- HPI Summary


HPI Summary: 


Patient is a 30yo female presenting with intermittent dull HA, sinus congestion

, intermittent nausea and "feeling off" x1.5 weeks. Also notes low grade fever 

of 99.9 that began 3 days ago and lasted 2 days. Patient states fever resolved 

with advil and that HA responds to tylenol. Denies current nausea. Denies ear 

pain. Denies changes in vision. Denies sore throat and cough. Denies chest pain

, SOB, and wheezing. Denies myalgia. Denies decreased appetite or fluid intake. 

Denies vomiting, diarrhea, and abdominal pain. Denies muscle weakness. Denies 

possibility of pregnancy. Denies asthma or allergies.








- History of Current Complaint


Chief Complaint: UCGeneralIllness


Stated Complaint: HEADACHE, AND FEVER


Hx Obtained From: Patient


Hx Last Menstrual Period: 19


Onset/Duration: Gradual Onset, Lasting Weeks


Onset Severity: Mild


Current Severity: Mild


Pain Intensity: 3





- Allergy/Home Medications


Allergies/Adverse Reactions: 


 Allergies











Allergy/AdvReac Type Severity Reaction Status Date / Time


 


No Known Allergies Allergy   Verified 19 08:20











Home Medications: 


 Home Medications





Ibuprofen 600 mg PO Q6HR 19 [History Confirmed 19]











PMH/Surg Hx/FS Hx/Imm Hx


GI/ History: Gastroesophageal Reflux





- Surgical History


Surgical History: None


Surgery Procedure, Year, and Place: denies





- Family History


Known Family History: Positive: Cardiac Disease - Father  of heart attack 

age 45 - alcoholic, smoker


   Negative: Hypertension, Diabetes





- Social History


Alcohol Use: Weekly


Substance Use Type: None


Smoking Status (MU): Never Smoked Tobacco





- Immunization History


Most Recent Influenza Vaccination: 2015     season


Most Recent Tetanus Shot: UTD





Review of Systems


All Other Systems Reviewed And Are Negative: Yes


Constitutional: Positive: Fever.  Negative: Chills, Fatigue


Eyes: Positive: Negative


ENT: Positive: Sinus Congestion.  Negative: Sinus Pain/Tenderness


Respiratory: Positive: Negative


Cardiovascular: Positive: Negative


Gastrointestinal: Positive: Nausea.  Negative: Abdominal Pain, Vomiting, 

Diarrhea


Motor: Positive: Negative


Neurological: Positive: Headache





Physical Exam


Triage Information Reviewed: Yes


Appearance: Well-Appearing, No Pain Distress, Well-Nourished


Vital Signs: 


 Initial Vital Signs











Temp  98.9 F   19 08:18


 


Pulse  80   19 08:18


 


Resp  18   19 08:18


 


BP  138/84   19 08:18


 


Pulse Ox  100   19 08:18











Vital Signs Reviewed: Yes


Eyes: Positive: Conjunctiva Clear


ENT: Positive: Hearing grossly normal, Pharynx normal, Nasal congestion, TMs 

normal, Uvula midline.  Negative: Sinus tenderness


Neck exam: Normal


Neck: Positive: Supple, Nontender, No Lymphadenopathy


Respiratory Exam: Normal


Respiratory: Positive: Lungs clear, Normal breath sounds, No respiratory 

distress, No accessory muscle use


Cardiovascular Exam: Normal


Cardiovascular: Positive: RRR


Neurological: Positive: Alert, Muscle Tone Normal


Psychological: Positive: Age Appropriate Behavior





Course/Dx





- Course


Course Of Treatment: 


Discussed likely viral etiology of congestion. Instructed to continue with 

symptomatic treatment including mucines, flonase, and increased fluids. 

Instructed to follow up with pcp if symptoms persist. Patient voiced 

understanding and agreed with treatment plan.








- Diagnoses


Provider Diagnosis: 


 Nasal congestion








Discharge ED





- Sign-Out/Discharge


Documenting (check all that apply): Patient Departure


All imaging exams completed and their final reports reviewed: No Studies





- Discharge Plan


Condition: Stable


Disposition: HOME


Patient Education Materials:  Viral Syndrome (ED)


Referrals: 


Alen Sanchez MD [Primary Care Provider] - If Needed


Additional Instructions: 


As discussed, your symptoms are most likely caused by a virus.


You may take mucinex to help reduce your nasal congestion.


You may also use flonase for symptomatic relief.


You may continue to take ibuprofen as directed for pain relief.


Get plenty of rest and fluids.


Follow up with your primary care doctor if your symptoms worsen or do not 

resolve within 10 days.








- Billing Disposition and Condition


Condition: STABLE


Disposition: Home

## 2019-11-29 ENCOUNTER — HOSPITAL ENCOUNTER (EMERGENCY)
Dept: HOSPITAL 25 - ED | Age: 29
Discharge: HOME | End: 2019-11-29
Payer: COMMERCIAL

## 2019-11-29 VITALS — DIASTOLIC BLOOD PRESSURE: 73 MMHG | SYSTOLIC BLOOD PRESSURE: 131 MMHG

## 2019-11-29 DIAGNOSIS — R19.7: ICD-10-CM

## 2019-11-29 DIAGNOSIS — R10.9: Primary | ICD-10-CM

## 2019-11-29 LAB
ALBUMIN SERPL BCG-MCNC: 4.1 G/DL (ref 3.2–5.2)
ALBUMIN/GLOB SERPL: 1.8 {RATIO} (ref 1–3)
ALP SERPL-CCNC: 50 U/L (ref 34–104)
ALT SERPL W P-5'-P-CCNC: 11 U/L (ref 7–52)
ANION GAP SERPL CALC-SCNC: 8 MMOL/L (ref 2–11)
AST SERPL-CCNC: 13 U/L (ref 13–39)
BASOPHILS # BLD AUTO: 0.1 10^3/UL (ref 0–0.2)
BUN SERPL-MCNC: 14 MG/DL (ref 6–24)
BUN/CREAT SERPL: 22.6 (ref 8–20)
CALCIUM SERPL-MCNC: 9.1 MG/DL (ref 8.6–10.3)
CHLORIDE SERPL-SCNC: 106 MMOL/L (ref 101–111)
EOSINOPHIL # BLD AUTO: 0.2 10^3/UL (ref 0–0.6)
GLOBULIN SER CALC-MCNC: 2.3 G/DL (ref 2–4)
GLUCOSE SERPL-MCNC: 98 MG/DL (ref 70–100)
HCG SERPL QL: < 0.6 MIU/ML
HCO3 SERPL-SCNC: 24 MMOL/L (ref 22–32)
HCT VFR BLD AUTO: 39 % (ref 35–47)
HGB BLD-MCNC: 13.4 G/DL (ref 12–16)
LYMPHOCYTES # BLD AUTO: 2.7 10^3/UL (ref 1–4.8)
MCH RBC QN AUTO: 31 PG (ref 27–31)
MCHC RBC AUTO-ENTMCNC: 34 G/DL (ref 31–36)
MCV RBC AUTO: 91 FL (ref 80–97)
MONOCYTES # BLD AUTO: 0.9 10^3/UL (ref 0–0.8)
NEUTROPHILS # BLD AUTO: 5.7 10^3/UL (ref 1.5–7.7)
NRBC # BLD AUTO: 0 10^3/UL
NRBC BLD QL AUTO: 0
PLATELET # BLD AUTO: 302 10^3/UL (ref 150–450)
POTASSIUM SERPL-SCNC: 4 MMOL/L (ref 3.5–5)
PROT SERPL-MCNC: 6.4 G/DL (ref 6.4–8.9)
RBC # BLD AUTO: 4.36 10^6 /UL (ref 3.7–4.87)
RBC UR QL AUTO: (no result)
SODIUM SERPL-SCNC: 138 MMOL/L (ref 135–145)
WBC # BLD AUTO: 9.5 10^3/UL (ref 3.5–10.8)
WBC UR QL AUTO: (no result)

## 2019-11-29 PROCEDURE — 76830 TRANSVAGINAL US NON-OB: CPT

## 2019-11-29 PROCEDURE — 87086 URINE CULTURE/COLONY COUNT: CPT

## 2019-11-29 PROCEDURE — 36415 COLL VENOUS BLD VENIPUNCTURE: CPT

## 2019-11-29 PROCEDURE — 80053 COMPREHEN METABOLIC PANEL: CPT

## 2019-11-29 PROCEDURE — 99283 EMERGENCY DEPT VISIT LOW MDM: CPT

## 2019-11-29 PROCEDURE — 76775 US EXAM ABDO BACK WALL LIM: CPT

## 2019-11-29 PROCEDURE — 85025 COMPLETE CBC W/AUTO DIFF WBC: CPT

## 2019-11-29 PROCEDURE — 86140 C-REACTIVE PROTEIN: CPT

## 2019-11-29 PROCEDURE — 81003 URINALYSIS AUTO W/O SCOPE: CPT

## 2019-11-29 PROCEDURE — 84702 CHORIONIC GONADOTROPIN TEST: CPT

## 2019-11-29 PROCEDURE — 81015 MICROSCOPIC EXAM OF URINE: CPT

## 2019-11-29 NOTE — ED
Abdominal Pain/Female





- HPI Summary


HPI Summary: 


Pt. is a 29 y.o female who presents to the ER for intermittent abdominal pain x 

1 month. Pt. notes pain is typically to her right side/back and radiates to 

lower abd. Associated sxs of diarrhea. Denies associated fever, cough, N/V, 

constipation, dysuria, hematuria, vaginal discharge. Pt. denies past hx. Sxs 

are moderate in severity. No modifying factors. Pt. describes pain as dull and 

achy. 








- History of Current Complaint


Chief Complaint: EDGeneral


Stated Complaint: R SIDE PAIN PER PT


Time Seen by Provider: 19 07:32


Hx Obtained From: Patient


Hx Last Menstrual Period: 19


Pain Intensity: 0


Allergies/Adverse Reactions: 


 Allergies











Allergy/AdvReac Type Severity Reaction Status Date / Time


 


No Known Allergies Allergy   Verified 19 07:25











Home Medications: 


 Home Medications





ALPRAZolam [Alprazolam] 0.25 mg PO Q8H PRN 19 [History Confirmed 19]











PMH/Surg Hx/FS Hx/Imm Hx


Previously Healthy: Yes


Endocrine/Hematology History: 


   Denies: Hx Diabetes, Hx Thyroid Disease


Cardiovascular History: 


   Denies: Hx Hypertension


Respiratory History: Reports: Other Respiratory Problems/Disorders - bronchitis 

about 8 wks ago


   Denies: Hx Asthma, Hx Chronic Obstructive Pulmonary Disease (COPD)


GI History: 


   Denies: Hx Ulcer


 History: 


   Denies: Hx Kidney Stones, Hx Renal Disease





- Surgical History


Surgery Procedure, Year, and Place: denies


Infectious Disease History: No


Infectious Disease History: 


   Denies: Hx Hepatitis, Hx Human Immunodeficiency Virus (HIV), History Other 

Infectious Disease, Traveled Outside the [place] in Last 30 Days





- Family History


Known Family History: Positive: Cardiac Disease - Father  of heart attack 

age 45 - alcoholic, smoker


   Negative: Hypertension, Diabetes





- Social History


Alcohol Use: Weekly


Hx Substance Use: No


Substance Use Type: Reports: None


Hx Tobacco Use: No


Smoking Status (MU): Never Smoked Tobacco





Review of Systems


Constitutional: Negative


Negative: Fever, Chills


Eyes: Negative


ENT: Negative


Cardiovascular: Negative


Negative: Chest Pain


Respiratory: Negative


Negative: Shortness Of Breath, Cough


Positive: Abdominal Pain, Diarrhea.  Negative: Vomiting, Nausea


Positive: flank pain.  Negative: burning, discharge


Musculoskeletal: Negative


Skin: Negative


Neurological: Negative


All Other Systems Reviewed And Are Negative: Yes





Physical Exam


Triage Information Reviewed: Yes


Vital Signs On Initial Exam: 


 Initial Vitals











Temp Pulse Resp BP Pulse Ox


 


 97.6 F   76   19   143/90   100 


 


 19 07:23  19 07:23  19 07:23  19 07:23  19 07:23











Vital Signs Reviewed: Yes


Appearance: Positive: Well-Appearing - Pt. sitting up in bed in NAD. Pleasant.


Skin: Positive: Warm, Dry


Head/Face: Positive: Normal Head/Face Inspection


Eyes: Positive: Normal, EOMI, TAYLOR


ENT: Positive: Pharynx normal, TMs normal


Neck: Positive: Supple


Respiratory/Lung Sounds: Positive: Clear to Auscultation, Breath Sounds 

Present.  Negative: Rales, Rhonchi, Wheezes


Cardiovascular: Positive: Normal, RRR


Abdomen Description: Positive: Other: - Minimal right CVA tenderness. Abd. is 

soft with very mild RLQ and LLQ pain. No rebound or guarding.


Neurological: Positive: Normal, CN Intact II-III


Psychiatric: Positive: Affect/Mood Appropriate





Procedures





- Sedation


Patient Received Moderate/Deep Sedation with Procedure: No





Diagnostics





- Vital Signs


 Vital Signs











  Temp Pulse Resp BP Pulse Ox


 


 19 07:23  97.6 F  76  19  143/90  100














- Laboratory


Result Diagrams: 


 19 07:43





 19 07:43


Lab Statement: Any lab studies that have been ordered have been reviewed, and 

results considered in the medical decision making process.





Abdominal Pain Fem Course/Dx





- Course


Course Of Treatment: Pt. presenting with intermittent abd. pain. She is 

afebrile with stable VS. Exam is fairly benign with minimal tenderness. Pain 

today 2-3/10. Will start with basic labs and u/a.  Blood work unremarkable 

including normal WBC and CRP. Negative pregnancy. U/A shows RBCs and WBCs. 

Negative bacteria and nitrates.  U/S of kidney and pelvis ordered for further 

evaluation of potential ovarian cyst, urolithiasis.  Ultrasounds negative per 

radiology. Unclear of etiology of abd. pain and intermittent diarrhea. 

Discussed stool culture but pt states she is unable to provide one. Recommend 

probiotic daily. To call pcp on monday for close f.u for further evaluation. To 

return to er for severe pain, vomiting, fever or if concerned. Pt. understands 

and agrees with plan.





- Diagnoses


Differential Diagnosis: Positive: Appendicitis, Constipation, Ectopic Pregnancy

, Irritable Bowel Syndrome, Ovarian Cyst, Pelvic Inflammatory Disease, Pregnancy

, Renal Colic, Urinary Tract Infection


Provider Diagnoses: 


 Abdominal pain, Diarrhea








Discharge ED





- Sign-Out/Discharge


Documenting (check all that apply): Patient Departure





- Discharge Plan


Condition: Good


Disposition: HOME


Patient Education Materials:  Acute Diarrhea (ED), Abdominal Pain (ED)


Referrals: 


Alen Sanchez MD [Primary Care Provider] - 


Additional Instructions: 


Schedule a follow up appointment with your PCP


Recommend taking an over the counter probiotic


Will call if urine culture is positive


Return to ER for fever, severe pain, vomiting, or if concerned





- Billing Disposition and Condition


Condition: GOOD


Disposition: Home